# Patient Record
Sex: MALE | Race: WHITE
[De-identification: names, ages, dates, MRNs, and addresses within clinical notes are randomized per-mention and may not be internally consistent; named-entity substitution may affect disease eponyms.]

---

## 2017-03-17 ENCOUNTER — HOSPITAL ENCOUNTER (EMERGENCY)
Dept: HOSPITAL 79 - ER1 | Age: 37
Discharge: HOME | End: 2017-03-17
Payer: COMMERCIAL

## 2017-03-17 DIAGNOSIS — S61.531A: Primary | ICD-10-CM
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
## 2017-05-19 ENCOUNTER — HOSPITAL ENCOUNTER (OUTPATIENT)
Dept: HOSPITAL 79 - LAB | Age: 37
End: 2017-05-19
Payer: COMMERCIAL

## 2017-05-19 DIAGNOSIS — Z02.1: Primary | ICD-10-CM

## 2022-12-12 ENCOUNTER — TRANSCRIBE ORDERS (OUTPATIENT)
Dept: ADMINISTRATIVE | Facility: HOSPITAL | Age: 42
End: 2022-12-12

## 2022-12-12 DIAGNOSIS — R10.11 ABDOMINAL PAIN, RIGHT UPPER QUADRANT: Primary | ICD-10-CM

## 2022-12-13 ENCOUNTER — HOSPITAL ENCOUNTER (OUTPATIENT)
Dept: ULTRASOUND IMAGING | Facility: HOSPITAL | Age: 42
Discharge: HOME | End: 2022-12-13
Admitting: FAMILY MEDICINE

## 2022-12-13 DIAGNOSIS — R10.11 ABDOMINAL PAIN, RIGHT UPPER QUADRANT: ICD-10-CM

## 2022-12-13 PROCEDURE — 76705 ECHO EXAM OF ABDOMEN: CPT

## 2022-12-13 PROCEDURE — 76705 ECHO EXAM OF ABDOMEN: CPT | Performed by: RADIOLOGY

## 2025-04-01 ENCOUNTER — NURSE TRIAGE (OUTPATIENT)
Dept: CALL CENTER | Facility: HOSPITAL | Age: 45
End: 2025-04-01
Payer: COMMERCIAL

## 2025-04-01 NOTE — TELEPHONE ENCOUNTER
He received a email about measles vaccine. He works [employer]. He is concerned about immunosuppressive medications he is on. He will speak with his PCP.     Reason for Disposition   Immunization needed, questions about    Additional Information   Negative: [1] Difficulty breathing or swallowing AND [2] starts within 2 hours after injection   Negative: Difficult to awaken or acting confused (e.g., disoriented, slurred speech)   Negative: Unresponsive, passed out, or very weak   Negative: Sounds like a life-threatening emergency to the triager   Negative: COVID-19 vaccine reaction suspected (e.g., fever, headache, muscle aches occurring during days 1-3 after getting vaccine)   Negative: COVID-19 vaccine, questions about   Negative: Fever > 104 F (40 C)   Negative: [1] Measles vaccine rash (onset day 6-12) AND [2] purple or blood-colored   Negative: Sounds like a severe, unusual reaction to the triager   Negative: [1] Redness or red streak around the injection site AND [2] begins > 48 hours after shot AND [3] fever   Negative: [1] Fever > 101 F (38.3 C) AND [2] age > 60 years AND [3] begins > 48 hours after getting vaccine   Negative: [1] Fever > 100.0 F (37.8 C) AND [2] bedridden (e.g., CVA, chronic illness, recovering from surgery)   Negative: [1] Fever > 100.0 F (37.8 C) AND [2] diabetes mellitus or weak immune system (e.g., HIV positive, cancer chemo, splenectomy, organ transplant, chronic steroids)   Negative: Fever present > 3 days (72 hours)   Negative: [1] Smallpox vaccine and [2] eye pain, eye redness, or rash on eyelids   Negative: [1] Redness or red streak around the injection site AND [2] begins > 48 hours after shot AND [3] no fever  (Exception: Red area < 1 inch or 2.5 cm wide.)   Negative: [1] Over 3 days (72 hours) since shot AND [2] redness, swelling or pain getting worse   Negative: [1] Pain, tenderness, or swelling at the injection site AND [2] persists > 3 days   Negative: [1] Measles  "vaccine rash (onset day 6-12) AND [2] persists > 3 days   Negative: [1] Deep lump follows (in 2 to 8 weeks) Td or TDaP  shot AND [2] becomes tender to the touch   Negative: Injection site reaction to any vaccine   Negative: [1] Vaccines for travel, questions about AND [2] no current symptoms   Negative: Anthrax vaccine reactions   Negative: Chickenpox (varicella) vaccine reactions   Negative: Hepatitis A (HAV) vaccine reactions   Negative: Hepatitis B (HBV) vaccine reactions   Negative: Human Papilloma Virus (HPV) vaccine reactions   Negative: H1N1 Influenza (inactivated) injected vaccine reactions   Negative: H1N1 Influenza (LAIV) intranasal vaccine reactions   Negative: Influenza (TIV; Injection) injected vaccine reactions   Negative: Influenza (LAIV; Intranasal) intranasal vaccine reactions   Negative: Japanese encephalitis vaccine reactions   Negative: Measles, Mumps, Rubella (MMR) vaccine reactions   Negative: Meningococcal vaccine reactions   Negative: MVA Mpox or smallpox (IMVAMUNE in [place], JYNNEOS in [place] vaccine reactions   Negative: Pneumococcal vaccine reactions   Negative: Polio (IPV) vaccine reactions   Negative: Rabies vaccine reactions   Negative: Shingles (Herpes zoster; Shingrix) vaccine reactions   Negative: Smallpox ([id number]) vaccine reactions   Negative: Tetanus-diphtheria (Td or Tdap) vaccine reaction: painless lump at  injection site   Negative: Tetanus-diphtheria (Td) vaccine reactions   Negative: Tetanus-diphtheria-pertussis (Tdap) vaccine reactions   Negative: Typhoid (oral) vaccine reactions   Negative: Typhoid (shot) vaccine reactions   Negative: Yellow Fever vaccine reactions    Answer Assessment - Initial Assessment Questions  1. SYMPTOMS: \"What is the main symptom?\" (e.g., redness, swelling, pain)       Asking questions about measles vaccine   2. ONSET: \"When was the vaccine (shot) given?\" \"How much later did the *No Answer* begin?\" (e.g., hours, days ago)       Have not yet " "received.   3. SEVERITY: \"How bad is it?\"       N/a  4. FEVER: \"Is there a fever?\" If Yes, ask: \"What is it, how was it measured, and when did it start?\"       N/a  5. IMMUNIZATIONS GIVEN: \"What shots have you recently received?\"      N/a  6. PAST REACTIONS: \"Have you reacted to immunizations before?\" If Yes, ask: \"What happened?\"      N/a  7. OTHER SYMPTOMS: \"Do you have any other symptoms?\"      none    Protocols used: Immunization Reactions-ADULT-AH    "

## 2025-04-11 ENCOUNTER — OFFICE VISIT (OUTPATIENT)
Dept: GASTROENTEROLOGY | Facility: CLINIC | Age: 45
End: 2025-04-11
Payer: COMMERCIAL

## 2025-04-11 VITALS — HEIGHT: 69 IN | HEART RATE: 75 BPM | SYSTOLIC BLOOD PRESSURE: 131 MMHG | DIASTOLIC BLOOD PRESSURE: 82 MMHG

## 2025-04-11 DIAGNOSIS — R14.0 ABDOMINAL BLOATING: Primary | ICD-10-CM

## 2025-04-11 DIAGNOSIS — R10.11 RUQ DISCOMFORT: ICD-10-CM

## 2025-04-11 PROCEDURE — 99204 OFFICE O/P NEW MOD 45 MIN: CPT | Performed by: NURSE PRACTITIONER

## 2025-04-11 RX ORDER — METHOTREXATE 2.5 MG/1
6 TABLET ORAL WEEKLY
COMMUNITY

## 2025-04-11 RX ORDER — FOLIC ACID 1 MG/1
1 TABLET ORAL DAILY
COMMUNITY

## 2025-04-11 NOTE — PROGRESS NOTES
"DATE OF CONSULTATION:  4/11/2025    REASON FOR REFERRAL: Abdominal bloating    REFERRING PHYSICIAN:  Lotus Cruz,*    CHIEF COMPLAINT:  Abdominal bloating    HISTORY OF PRESENT ILLNESS:   Driss Riley is a very pleasant 44 y.o. male who is being seen today at the request of Lotus Cruz,* for evaluation and treatment of abdominal bloating. Patient reports struggling with abdominal bloating for the past ~2 years.  He previously underwent ultrasound of the abdomen on 12/13/2022 which was ordered by his PCP and was unremarkable.  Patient reports having frequent abdominal bloating and intermittent episodes of RUQ abdominal discomfort which he describes as a \"fullness\".  Patient reports intermittent episodes of RUQ discomfort can last for a few days at a time.  He has noticed spicy foods will exacerbate symptoms which he tries to avoid.  Patient denies having epigastric or RUQ abdominal pain.  He denies burning in his throat/chest or regurgitation.  Denies nausea or vomiting.  Denies dysphagia.  He has occasional belching.  Denies unusual weight loss.  Of note, he retains his gallbladder.  His PCP obtained celiac panel on 12/16/2024 which was negative for celiac disease.  Food allergy profile was also obtained and negative.  Patient denies being checked for H. pylori.  He reports his bowels move 2-3 times per day with stool type III on [place] stool scale.  He feels as if he evacuates his colon well.  He denies melena, hematochezia or rectal bleeding.  He is without family history of colon cancer.  He has no other complaints today.    PAST MEDICAL HISTORY:  Past Medical History:   Diagnosis Date    Psoriatic arthritis        PAST SURGICAL HISTORY:  History reviewed. No pertinent surgical history.    FAMILY HISTORY:  Family History   Problem Relation Age of Onset    No Known Problems Mother     No Known Problems Father     No Known Problems Sister     No Known Problems Brother     No Known Problems " "Maternal Aunt     No Known Problems Maternal Uncle     No Known Problems Paternal Aunt     No Known Problems Paternal Uncle     No Known Problems Maternal Grandmother     No Known Problems Maternal Grandfather     No Known Problems Paternal Grandmother     No Known Problems Paternal Grandfather     No Known Problems Other        SOCIAL HISTORY:  Social History     Socioeconomic History    Marital status:    Tobacco Use    Smoking status: Never    Smokeless tobacco: Never   Substance and Sexual Activity    Alcohol use: Never    Drug use: Never    Sexual activity: Defer         MEDICATIONS:  The current medication list was reviewed in the EMR    Current Outpatient Medications:     folic acid (FOLVITE) 1 MG tablet, Take 1 tablet by mouth Daily., Disp: , Rfl:     methotrexate 2.5 MG tablet, Take 6 tablets by mouth 1 (One) Time Per Week., Disp: , Rfl:     ALLERGIES:    Allergies   Allergen Reactions    Penicillins Hives       REVIEW OF SYSTEMS:    A comprehensive 14 point review of systems was performed.  Significant findings as mentioned above.  All other systems reviewed and are negative.      Physical Exam   Vital Signs: /82 (BP Location: Left arm, Patient Position: Sitting, Cuff Size: Large Adult)   Pulse 75   Ht 175.3 cm (69\")    General: Well developed, well nourished, alert and oriented x 3, in no acute distress.   Head: ATNC   Eyes: PERRL, No evidence of conjunctivitis.   Nose: No nasal discharge.   Mouth: Oral mucosal membranes moist. No oral ulceration or hemorrhages.   Neck: Neck supple. No thyromegaly. No JVD.   Lungs: Clear in all fields to A&P without rales, rhonchi or wheezing.   Heart: Regular rate and rhythm. No murmurs, rubs, or gallops.   Abdomen: Soft. Bowel sounds are normoactive. Nontender with palpation.   Extremities: No cyanosis or edema.   Neurologic: Grossly non-focal exam      IMAGING:   Study Result    Narrative & Impression   EXAM:    US Abdomen Limited, Right Upper Quadrant   "   EXAM DATE:    2022 2:19 PM     CLINICAL HISTORY:    r10.11; R10.11-Right upper quadrant pain     TECHNIQUE:    Real-time ultrasound of the right upper quadrant with image  documentation.     COMPARISON:    No relevant prior studies available.     FINDINGS:    Liver:  Unremarkable.  No mass.  No intrahepatic bile duct dilation.    Gallbladder:  Unremarkable.  No gallstones.    Common bile duct:  Unremarkable as visualized.  No stones.  No  dilation.  Common bile duct measures 0.4 cm in diameter.    Pancreas:  Unremarkable as visualized.    Right kidney: Unremarkable.  No stones.  No solid mass.  No  hydronephrosis.     IMPRESSION:    No acute findings in the right upper quadrant.     This report was finalized on 2022 8:46 AM by Dr. Vic Calderon MD.         RECENT LABS:  Lab Results   Component Value Date    WBC 6.5 2024    HGB 16 2024    HCT 49.6 2024    MCV 91 2024    RDW 13.3 2024     2024    NEUTRORELPCT 64 2024    LYMPHORELPCT 27 2024    MONORELPCT 7 2024    EOSRELPCT 1 2024    BASORELPCT 1 2024    NEUTROABS 4.2 2024    LYMPHSABS 1.7 2024     ASSESSMENT & PLAN:  Driss Riley is a very pleasant 44 y.o. male with    1.  Abdominal bloatin.  RUQ discomfort:    -We discussed proceeding with EGD to evaluate the above chronic issues.  After discussing risks/benefits, patient would like to proceed.  Will schedule.  In the interim, recommended low FODMAP diet.  He was provided with handout in clinic today which was also reviewed.   -Will have patient return to clinic following EGD.    The patient was in agreement with the plan and all questions were answered to his satisfaction.     Thank you so much for allowing us to participate in the care of Driss Riley . Please do not hesitate to contact us with any questions or concerns.             Electronically Signed by: ELIER Warren , 2025 09:44  EDT       CC:   Lotus Cruz,*  Lotus Cruz MD

## 2025-06-10 ENCOUNTER — TELEPHONE (OUTPATIENT)
Dept: GASTROENTEROLOGY | Facility: CLINIC | Age: 45
End: 2025-06-10

## 2025-06-10 NOTE — TELEPHONE ENCOUNTER
Hub staff attempted to follow warm transfer process and was unsuccessful     Caller: Driss Riley    Relationship to patient: Self    Best call back number: [phone]    Patient is needing: PT IS CALLING TO RESCHEDULE PROCEDURE SCHEDULED FOR 06/17/2025. PLEASE GIVE PT A CALL BACK TO RESCHEDULE AND IF NOT ABLE TO REACH PT. IT IS OKAY TO LVM.

## 2025-06-10 NOTE — TELEPHONE ENCOUNTER
Called patient, no answer, left him a voicemail explaining and asked for him to give me a call back to let me know what he may want to do. Thanks